# Patient Record
Sex: FEMALE | Race: WHITE | Employment: UNEMPLOYED | URBAN - METROPOLITAN AREA
[De-identification: names, ages, dates, MRNs, and addresses within clinical notes are randomized per-mention and may not be internally consistent; named-entity substitution may affect disease eponyms.]

---

## 2018-07-28 ENCOUNTER — TELEPHONE (OUTPATIENT)
Dept: URGENT CARE | Facility: URGENT CARE | Age: 48
End: 2018-07-28

## 2018-07-28 ENCOUNTER — RADIANT APPOINTMENT (OUTPATIENT)
Dept: GENERAL RADIOLOGY | Facility: CLINIC | Age: 48
End: 2018-07-28
Attending: PHYSICIAN ASSISTANT
Payer: COMMERCIAL

## 2018-07-28 ENCOUNTER — OFFICE VISIT (OUTPATIENT)
Dept: URGENT CARE | Facility: URGENT CARE | Age: 48
End: 2018-07-28
Payer: COMMERCIAL

## 2018-07-28 VITALS
HEIGHT: 65 IN | BODY MASS INDEX: 26.66 KG/M2 | DIASTOLIC BLOOD PRESSURE: 81 MMHG | TEMPERATURE: 98.3 F | SYSTOLIC BLOOD PRESSURE: 120 MMHG | WEIGHT: 160 LBS | HEART RATE: 68 BPM | OXYGEN SATURATION: 97 %

## 2018-07-28 DIAGNOSIS — M25.532 LEFT WRIST PAIN: Primary | ICD-10-CM

## 2018-07-28 DIAGNOSIS — M25.532 LEFT WRIST PAIN: ICD-10-CM

## 2018-07-28 PROCEDURE — 73110 X-RAY EXAM OF WRIST: CPT | Mod: LT

## 2018-07-28 PROCEDURE — 99203 OFFICE O/P NEW LOW 30 MIN: CPT | Performed by: PHYSICIAN ASSISTANT

## 2018-07-28 RX ORDER — BUPROPION HYDROCHLORIDE 300 MG/1
TABLET ORAL
COMMUNITY
Start: 2018-05-14

## 2018-07-28 RX ORDER — TRAZODONE HYDROCHLORIDE 100 MG/1
200 TABLET ORAL
COMMUNITY
Start: 2017-11-17

## 2018-07-28 RX ORDER — ZOLPIDEM TARTRATE 10 MG/1
10 TABLET ORAL
COMMUNITY
Start: 2018-06-08

## 2018-07-28 NOTE — MR AVS SNAPSHOT
After Visit Summary   7/28/2018    Fadia Israel    MRN: [id number]           Patient Information     Date Of Birth          1970        Visit Information        Provider Department      7/28/2018 6:00 PM Jarred Peres PA-C [facility]        Today's Diagnoses     Left wrist pain    -  1      Care Instructions    Follow up with your primary if symptoms worsen or if not improving in 4-7 days    600mg Ibuprofen 3x daily  Tylenol as needed      Muscle Strain in the Extremities  A muscle strain is a stretching and tearing of muscle fibers. This causes pain, especially when you move that muscle. There may also be some swelling and bruising.  Home care    Keep the hurt area raised to reduce pain and swelling. This is especially important during the first 48 hours.    Apply an ice pack over the injured area for 15 to 20 minutes every 3 to 6 hours. You should do this for the first 24 to 48 hours. You can make an ice pack by filling a plastic bag that seals at the top with ice cubes and then wrapping it with a thin towel. Be careful not to injure your skin with the ice treatments. Ice should never be applied directly to skin. Continue the use of ice packs for relief of pain and swelling as needed. After 48 hours, apply heat (warm shower or warm bath) for 15 to 20 minutes several times a day, or alternate ice and heat.    You may use over-the-counter pain medicine to control pain, unless another medicine was prescribed. If you have chronic liver or kidney disease or ever had a stomach ulcer or GI bleeding, talk with your healthcare provider before using these medicines.    For leg strains: If crutches have been recommended, don t put full weight on the hurt leg until you can do so without pain. You can return to sports when you are able to hop and run on the injured leg without pain.  Follow-up care  Follow up with your healthcare provider, or as advised.  When to seek  "medical advice  Call your healthcare provider right away if any of these occur:    The toes of the injured leg become swollen, cold, blue, numb, or tingly    Pain or swelling increases  Date Last Reviewed: 2015-2017 [employer] [address] [place] [address]. All rights reserved. This information is not intended as a substitute for professional medical care. Always follow your healthcare professional's instructions.                Follow-ups after your visit        Who to contact     If you have questions or need follow up information about today's clinic visit or your schedule please contact [facility] directly at [phone].  Normal or non-critical lab and imaging results will be communicated to you by MyChart, letter or phone within 4 business days after the clinic has received the results. If you do not hear from us within 7 days, please contact the clinic through [employer] or phone. If you have a critical or abnormal lab result, we will notify you by phone as soon as possible.  Submit refill requests through [employer] or call your pharmacy and they will forward the refill request to us. Please allow 3 business days for your refill to be completed.          Additional Information About Your Visit        MyChart Information     [employer] lets you send messages to your doctor, view your test results, renew your prescriptions, schedule appointments and more. To sign up, go to [email or web address] . Click on \"Log in\" on the left side of the screen, which will take you to the Welcome page. Then click on \"Sign up Now\" on the right side of the page.     You will be asked to enter the access code listed below, as well as some personal information. Please follow the directions to create your username and password.     Your access code is: [email or web address]  Expires: 10/26/2018  6:45 PM     Your access code will  in 90 days. If you need help or a new code, " "please call your [place] clinic or [phone].        Care EveryWhere ID     This is your Care EveryWhere ID. This could be used by other organizations to access your [place] medical records  [id number]        Your Vitals Were     Pulse Temperature Height Pulse Oximetry Breastfeeding? BMI (Body Mass Index)    68 98.3  F (36.8  C) (Tympanic) 5' 5\" (1.651 m) 97% No 26.63 kg/m2       Blood Pressure from Last 3 Encounters:   07/28/18 120/81    Weight from Last 3 Encounters:   07/28/18 160 lb (72.6 kg)                 Today's Medication Changes          These changes are accurate as of 7/28/18  6:45 PM.  If you have any questions, ask your nurse or doctor.               Start taking these medicines.        Dose/Directions    order for DME   Used for:  Left wrist pain   Started by:  Jarred Peres PA-C        Equipment being ordered: wrist splint with thumb spica   Quantity:  1 Device   Refills:  0            Where to get your medicines      Some of these will need a paper prescription and others can be bought over the counter.  Ask your nurse if you have questions.     Bring a paper prescription for each of these medications     order for DME                Primary Care Provider Fax #    Physician No Ref-Primary [phone]       No address on file        Equal Access to Services     GIL ADAMS AH: Addi durando Soveroali, waaxda luqadaha, qaybta kaalmada adeegyada, kika trujillo. So [facility] [phone].    ATENCIÓN: Si habla español, tiene a gongora disposición servicios gratuitos de asistencia lingüística. Llame al [phone].    We comply with applicable federal civil rights laws and [place] laws. We do not discriminate on the basis of race, color, national origin, age, disability, sex, sexual orientation, or gender identity.            Thank you!     Thank you for choosing [facility]  for your care. Our goal is always to provide you with " excellent care. Hearing back from our patients is one way we can continue to improve our services. Please take a few minutes to complete the written survey that you may receive in the mail after your visit with us. Thank you!             Your Updated Medication List - Protect others around you: Learn how to safely use, store and throw away your medicines at www.disposemymeds.org.          This list is accurate as of 7/28/18  6:45 PM.  Always use your most recent med list.                   Brand Name Dispense Instructions for use Diagnosis    buPROPion 300 MG 24 hr tablet    WELLBUTRIN XL     TAKE ONE TABLET BY MOUTH ONCE DAILY IN THE MORNING        order for DME     1 Device    Equipment being ordered: wrist splint with thumb spica    Left wrist pain       sertraline 50 MG tablet    ZOLOFT     Take 50 mg by mouth        traZODone 100 MG tablet    DESYREL     Take 200 mg by mouth        zolpidem 10 MG tablet    AMBIEN     Take 10 mg by mouth

## 2018-07-28 NOTE — PATIENT INSTRUCTIONS
Follow up with your primary if symptoms worsen or if not improving in 4-7 days    600mg Ibuprofen 3x daily  Tylenol as needed      Muscle Strain in the Extremities  A muscle strain is a stretching and tearing of muscle fibers. This causes pain, especially when you move that muscle. There may also be some swelling and bruising.  Home care    Keep the hurt area raised to reduce pain and swelling. This is especially important during the first 48 hours.    Apply an ice pack over the injured area for 15 to 20 minutes every 3 to 6 hours. You should do this for the first 24 to 48 hours. You can make an ice pack by filling a plastic bag that seals at the top with ice cubes and then wrapping it with a thin towel. Be careful not to injure your skin with the ice treatments. Ice should never be applied directly to skin. Continue the use of ice packs for relief of pain and swelling as needed. After 48 hours, apply heat (warm shower or warm bath) for 15 to 20 minutes several times a day, or alternate ice and heat.    You may use over-the-counter pain medicine to control pain, unless another medicine was prescribed. If you have chronic liver or kidney disease or ever had a stomach ulcer or GI bleeding, talk with your healthcare provider before using these medicines.    For leg strains: If crutches have been recommended, don t put full weight on the hurt leg until you can do so without pain. You can return to sports when you are able to hop and run on the injured leg without pain.  Follow-up care  Follow up with your healthcare provider, or as advised.  When to seek medical advice  Call your healthcare provider right away if any of these occur:    The toes of the injured leg become swollen, cold, blue, numb, or tingly    Pain or swelling increases  Date Last Reviewed: 11/19/2015 2000-2017 [employer]. [address] [place] [address], [place] [address]. All rights reserved. This information is not intended as a substitute  for professional medical care. Always follow your healthcare professional's instructions.

## 2018-07-28 NOTE — PROGRESS NOTES
"SUBJECTIVE:  Chief Complaint   Patient presents with     Urgent Care     Wrist Injury     c/o wrist injury for 1 day     Fadia Israel is a 47 year old female presents with a chief complaint of left wrist pain.  The injury occurred 1 day(s) ago.   The injury happened while playing. How: catching hwer nephew.  The patient complained of mild pain  and has not had decreased ROM.  Pain exacerbated by flexion/extension.  Relieved by rest.  She treated it initially with no therapy. This is the first time this type of injury has occurred to this patient.     No past medical history on file.  Current Outpatient Prescriptions   Medication Sig Dispense Refill     buPROPion (WELLBUTRIN XL) 300 MG 24 hr tablet TAKE ONE TABLET BY MOUTH ONCE DAILY IN THE MORNING       sertraline (ZOLOFT) 50 MG tablet Take 50 mg by mouth       traZODone (DESYREL) 100 MG tablet Take 200 mg by mouth       zolpidem (AMBIEN) 10 MG tablet Take 10 mg by mouth       Social History   Substance Use Topics     Smoking status: Never Smoker     Smokeless tobacco: Never Used     Alcohol use Not on file       ROS:  Review of systems negative except as stated above.    EXAM:   /81  Pulse 68  Temp 98.3  F (36.8  C) (Tympanic)  Ht 5' 5\" (1.651 m)  Wt 160 lb (72.6 kg)  SpO2 97%  Breastfeeding? No  BMI 26.63 kg/m2  Gen: healthy,alert,no distress  Extremity: swelling and point tenderness at radial aspect  CHEST: clear to auscultation  CV: regular rate and rhythm  EXTREMITIES: peripheral pulses normal  NEURO: Normal strength and tone, sensory exam grossly normal, mentation intact and speech normal    X-RAY WNL on initial read    ASSESSMENT:   (M25.532) Left wrist pain  (primary encounter diagnosis)  Comment: no fracture or dislocation observed on exam  Plan: XR Wrist Left G/E 3 Views, order for DME      Patient Instructions   Follow up with your primary if symptoms worsen or if not improving in 4-7 days    600mg Ibuprofen 3x daily  Tylenol as " needed      Muscle Strain in the Extremities  A muscle strain is a stretching and tearing of muscle fibers. This causes pain, especially when you move that muscle. There may also be some swelling and bruising.  Home care    Keep the hurt area raised to reduce pain and swelling. This is especially important during the first 48 hours.    Apply an ice pack over the injured area for 15 to 20 minutes every 3 to 6 hours. You should do this for the first 24 to 48 hours. You can make an ice pack by filling a plastic bag that seals at the top with ice cubes and then wrapping it with a thin towel. Be careful not to injure your skin with the ice treatments. Ice should never be applied directly to skin. Continue the use of ice packs for relief of pain and swelling as needed. After 48 hours, apply heat (warm shower or warm bath) for 15 to 20 minutes several times a day, or alternate ice and heat.    You may use over-the-counter pain medicine to control pain, unless another medicine was prescribed. If you have chronic liver or kidney disease or ever had a stomach ulcer or GI bleeding, talk with your healthcare provider before using these medicines.    For leg strains: If crutches have been recommended, don t put full weight on the hurt leg until you can do so without pain. You can return to sports when you are able to hop and run on the injured leg without pain.  Follow-up care  Follow up with your healthcare provider, or as advised.  When to seek medical advice  Call your healthcare provider right away if any of these occur:    The toes of the injured leg become swollen, cold, blue, numb, or tingly    Pain or swelling increases  Date Last Reviewed: 11/19/2015 2000-2017 [employer]. [address] [place] [address], [place] [address]. All rights reserved. This information is not intended as a substitute for professional medical care. Always follow your healthcare professional's instructions.

## 2018-07-29 NOTE — TELEPHONE ENCOUNTER
Left message for pt to call back re:    Xray results.   FINDINGS: There are small osseous densities located just distal to the  radial styloid. This probably represents an avulsion fracture  fragments.. The chronicity of this finding is uncertain. Correlation  with the location of the patient's symptoms is suggested. There is  soft tissue swelling over the dorsum of the wrist.     Please inform pt and advise to follow up with pcp in 1-2 days  francy johnson

## 2019-03-27 ENCOUNTER — OFFICE VISIT (OUTPATIENT)
Dept: URGENT CARE | Facility: URGENT CARE | Age: 49
End: 2019-03-27
Payer: COMMERCIAL

## 2019-03-27 VITALS
SYSTOLIC BLOOD PRESSURE: 160 MMHG | OXYGEN SATURATION: 97 % | BODY MASS INDEX: 30.54 KG/M2 | HEART RATE: 83 BPM | TEMPERATURE: 98.8 F | WEIGHT: 183.5 LBS | DIASTOLIC BLOOD PRESSURE: 109 MMHG

## 2019-03-27 DIAGNOSIS — J06.9 UPPER RESPIRATORY TRACT INFECTION, UNSPECIFIED TYPE: ICD-10-CM

## 2019-03-27 DIAGNOSIS — R07.0 THROAT PAIN: Primary | ICD-10-CM

## 2019-03-27 LAB
DEPRECATED S PYO AG THROAT QL EIA: NORMAL
FLUAV+FLUBV AG SPEC QL: NEGATIVE
FLUAV+FLUBV AG SPEC QL: NEGATIVE
SPECIMEN SOURCE: NORMAL
SPECIMEN SOURCE: NORMAL

## 2019-03-27 PROCEDURE — 87880 STREP A ASSAY W/OPTIC: CPT | Performed by: FAMILY MEDICINE

## 2019-03-27 PROCEDURE — 87804 INFLUENZA ASSAY W/OPTIC: CPT | Performed by: FAMILY MEDICINE

## 2019-03-27 PROCEDURE — 87081 CULTURE SCREEN ONLY: CPT | Performed by: FAMILY MEDICINE

## 2019-03-27 PROCEDURE — 99213 OFFICE O/P EST LOW 20 MIN: CPT | Performed by: FAMILY MEDICINE

## 2019-03-27 RX ORDER — PHENTERMINE HYDROCHLORIDE 30 MG/1
CAPSULE ORAL
Refills: 2 | COMMUNITY
Start: 2019-03-21

## 2019-03-27 ASSESSMENT — PAIN SCALES - GENERAL: PAINLEVEL: NO PAIN (0)

## 2019-03-28 LAB
BACTERIA SPEC CULT: NORMAL
SPECIMEN SOURCE: NORMAL

## 2019-03-28 NOTE — PROGRESS NOTES
"SUBJECTIVE:   Fadia Israel is a 48 year old female presenting with   Chief Complaint   Patient presents with     Urgent Care     3-4 days  Cough, vomiting and chills, and fever  patient stats her face was red to color . sore throat     Symptoms started 3/24 and include: feverish (tactile), chills, occasional cough and sore throat.  Ears have been \"clicking\" on the left.  Has been nauseated since starting a new prescription for prozac, which she started on Friday. Threw up yesterday.  After that, she quit taking the prozac and hasn't restarted.  Flushed face earlier today, resolved before she came to clinic.  No rashes or hives.  No chest pain or shortness of breath.      ROS:  5-Point Review of Systems Negative-- Except as stated above.    OBJECTIVE  BP (!) 160/109   Pulse 83   Temp 98.8  F (37.1  C) (Oral)   Wt 83.2 kg (183 lb 8 oz)   SpO2 97%   BMI 30.54 kg/m    GENERAL:  Awake, alert and interactive. No acute distress.  HEENT:   NC/AT, EOMI, clear conjunctiva.  Clear nasal discharge.  Oropharynx benign, moist and clear.  Lips and tongue with benign appearance.  TM's and EAC's benign.  No facial erythema, swelling or rashes noted.   NECK: supple and free of adenopathy  CHEST:  Lungs are clear, no rhonchi, wheezing or rales. Normal symmetric air entry throughout both lung fields.   HEART:  S1 and S2 normal, no murmurs. Regular rate and rhythm.    Labs:  Results for orders placed or performed in visit on 03/27/19   Strep, Rapid Screen   Result Value Ref Range    Specimen Description Throat     Rapid Strep A Screen       NEGATIVE: No Group A streptococcal antigen detected by immunoassay, await culture report.   Influenza A/B antigen   Result Value Ref Range    Influenza A/B Agn Specimen Nasopharyngeal     Influenza A Negative NEG^Negative    Influenza B Negative NEG^Negative       ASSESSMENT/PLAN    ICD-10-CM    1. Throat pain R07.0 Strep, Rapid Screen     Beta strep group A culture   2. Upper respiratory tract " infection, unspecified type J06.9 Influenza A/B antigen       Elevated BP today.   Issues with nausea and vomiting after starting the new medication on 3/22.  Quit taking on 3/19.  Don't restart at this point.   F/u with PCP for recheck BP and to discuss medication - she is interested in possibly switching medications.  Some mild uri symptoms as well currently.  We discussed symptomatic cares.   Advised to return to care if symptoms not improving as expected, do not resolve completely, or if any new or worsening symptoms develop.